# Patient Record
Sex: FEMALE | Race: WHITE
[De-identification: names, ages, dates, MRNs, and addresses within clinical notes are randomized per-mention and may not be internally consistent; named-entity substitution may affect disease eponyms.]

---

## 2019-09-02 ENCOUNTER — HOSPITAL ENCOUNTER (EMERGENCY)
Dept: HOSPITAL 62 - ER | Age: 64
Discharge: HOME | End: 2019-09-02
Payer: COMMERCIAL

## 2019-09-02 VITALS — SYSTOLIC BLOOD PRESSURE: 146 MMHG | DIASTOLIC BLOOD PRESSURE: 75 MMHG

## 2019-09-02 DIAGNOSIS — I25.10: ICD-10-CM

## 2019-09-02 DIAGNOSIS — E11.65: ICD-10-CM

## 2019-09-02 DIAGNOSIS — R10.9: ICD-10-CM

## 2019-09-02 DIAGNOSIS — R10.84: ICD-10-CM

## 2019-09-02 DIAGNOSIS — I11.0: ICD-10-CM

## 2019-09-02 DIAGNOSIS — I50.9: ICD-10-CM

## 2019-09-02 DIAGNOSIS — R11.2: Primary | ICD-10-CM

## 2019-09-02 DIAGNOSIS — Z79.899: ICD-10-CM

## 2019-09-02 DIAGNOSIS — K52.9: ICD-10-CM

## 2019-09-02 LAB
ABSOLUTE LYMPHOCYTES# (MANUAL): 0.7 10^3/UL (ref 0.5–4.7)
ABSOLUTE MONOCYTES # (MANUAL): 0.5 10^3/UL (ref 0.1–1.4)
ADD MANUAL DIFF: YES
ALBUMIN SERPL-MCNC: 4.3 G/DL (ref 3.5–5)
ALP SERPL-CCNC: 170 U/L (ref 38–126)
ANION GAP SERPL CALC-SCNC: 12 MMOL/L (ref 5–19)
ANISOCYTOSIS BLD QL SMEAR: SLIGHT
APPEARANCE UR: CLEAR
APTT PPP: (no result) S
AST SERPL-CCNC: 34 U/L (ref 14–36)
BASE EXCESS BLDV CALC-SCNC: 0.4 MMOL/L
BASOPHILS NFR BLD MANUAL: 0 % (ref 0–2)
BILIRUB DIRECT SERPL-MCNC: 0.3 MG/DL (ref 0–0.4)
BILIRUB SERPL-MCNC: 0.9 MG/DL (ref 0.2–1.3)
BILIRUB UR QL STRIP: NEGATIVE
BUN SERPL-MCNC: 22 MG/DL (ref 7–20)
CALCIUM: 9.3 MG/DL (ref 8.4–10.2)
CHLORIDE SERPL-SCNC: 100 MMOL/L (ref 98–107)
CO2 SERPL-SCNC: 28 MMOL/L (ref 22–30)
EOSINOPHIL NFR BLD MANUAL: 0 % (ref 0–6)
ERYTHROCYTE [DISTWIDTH] IN BLOOD BY AUTOMATED COUNT: 14.8 % (ref 11.5–14)
GLUCOSE SERPL-MCNC: 288 MG/DL (ref 75–110)
GLUCOSE UR STRIP-MCNC: >=500 MG/DL
HCO3 BLDV-SCNC: 26.3 MMOL/L (ref 20–32)
HCT VFR BLD CALC: 35.6 % (ref 36–47)
HGB BLD-MCNC: 12.1 G/DL (ref 12–15.5)
KETONES UR STRIP-MCNC: 20 MG/DL
MCH RBC QN AUTO: 27.7 PG (ref 27–33.4)
MCHC RBC AUTO-ENTMCNC: 34.1 G/DL (ref 32–36)
MCV RBC AUTO: 81 FL (ref 80–97)
MONOCYTES % (MANUAL): 4 % (ref 3–13)
NEUTS BAND NFR BLD MANUAL: 1 % (ref 3–5)
NITRITE UR QL STRIP: NEGATIVE
OVALOCYTES BLD QL SMEAR: SLIGHT
PCO2 BLDV: 47.1 MMHG (ref 35–63)
PH BLDV: 7.36 [PH] (ref 7.3–7.42)
PH UR STRIP: 6 [PH] (ref 5–9)
PLATELET # BLD: 234 10^3/UL (ref 150–450)
PLATELET COMMENT: ADEQUATE
POIKILOCYTOSIS BLD QL SMEAR: SLIGHT
POLYCHROMASIA BLD QL SMEAR: SLIGHT
POTASSIUM SERPL-SCNC: 3.8 MMOL/L (ref 3.6–5)
PROT SERPL-MCNC: 7 G/DL (ref 6.3–8.2)
PROT UR STRIP-MCNC: >=500 MG/DL
RBC # BLD AUTO: 4.39 10^6/UL (ref 3.72–5.28)
SEGMENTED NEUTROPHILS % (MAN): 89 % (ref 42–78)
SP GR UR STRIP: 1.01
TOTAL CELLS COUNTED BLD: 100
UROBILINOGEN UR-MCNC: NEGATIVE MG/DL (ref ?–2)
VARIANT LYMPHS NFR BLD MANUAL: 6 % (ref 13–45)
WBC # BLD AUTO: 11.4 10^3/UL (ref 4–10.5)

## 2019-09-02 PROCEDURE — 83690 ASSAY OF LIPASE: CPT

## 2019-09-02 PROCEDURE — 99284 EMERGENCY DEPT VISIT MOD MDM: CPT

## 2019-09-02 PROCEDURE — 87086 URINE CULTURE/COLONY COUNT: CPT

## 2019-09-02 PROCEDURE — 74177 CT ABD & PELVIS W/CONTRAST: CPT

## 2019-09-02 PROCEDURE — 96361 HYDRATE IV INFUSION ADD-ON: CPT

## 2019-09-02 PROCEDURE — 82803 BLOOD GASES ANY COMBINATION: CPT

## 2019-09-02 PROCEDURE — 80053 COMPREHEN METABOLIC PANEL: CPT

## 2019-09-02 PROCEDURE — 36415 COLL VENOUS BLD VENIPUNCTURE: CPT

## 2019-09-02 PROCEDURE — 85025 COMPLETE CBC W/AUTO DIFF WBC: CPT

## 2019-09-02 PROCEDURE — 96375 TX/PRO/DX INJ NEW DRUG ADDON: CPT

## 2019-09-02 PROCEDURE — 81001 URINALYSIS AUTO W/SCOPE: CPT

## 2019-09-02 PROCEDURE — 96374 THER/PROPH/DIAG INJ IV PUSH: CPT

## 2019-09-02 PROCEDURE — 82962 GLUCOSE BLOOD TEST: CPT

## 2019-09-02 PROCEDURE — 96376 TX/PRO/DX INJ SAME DRUG ADON: CPT

## 2019-09-02 NOTE — ER DOCUMENT REPORT
ED General





- General


Chief Complaint: Abdominal Pain


Stated Complaint: ABDOMINAL PAIN


Time Seen by Provider: 19 09:42


Notes: 





Patient is a 64-year-old female presents to the emergency department with 

approximately 8 episodes of non-bloody vomiting and 4 episodes of nonbloody 

diarrhea since  last evening.  Patient also complaining of generalized lower

abdominal pain.  Patient states it is sharp in nature and constant.


Patient's denying any dysuria or fever.


Nursing note states patient is noncompliant with her medications but when I 

asked the patient what kind of medication she is on she can name them all and 

states she takes them as prescribed.





Past medical history: Diabetes, hypertension, coronary artery disease, 

congestive heart failure


Medications: Metformin, Demerol, metoprolol, simvastatin, amlodipine, 

spironolactone


Allergies: Thiazides


TRAVEL OUTSIDE OF THE [place] IN LAST 30 DAYS: No





- Related Data


Allergies/Adverse Reactions: 


                                        





Thiazides Allergy (Verified 19 10:15)


   











Past Medical History





- General


Information source: Patient





- Social History


Smoking Status: Never Smoker


Chew tobacco use (# tins/day): No


Frequency of alcohol use: None


Drug Abuse: None


Family History: Reviewed & Not Pertinent


Patient has suicidal ideation: No


Patient has homicidal ideation: No





- Past Medical History


Cardiac Medical History: Reports: Hx Heart Attack, Hx Hypercholesterolemia, Hx 

Hypertension


Endocrine Medical History: Reports: Hx Diabetes Mellitus Type 2


Past Surgical History: Reports: Hx Breast Surgery - reduction, Hx Cardiac 

Catheterization, Hx Cardiac Surgery - bypass, Hx  Section - x2, Hx Orth

opedic Surgery - back





Review of Systems





- Review of Systems


Constitutional: denies: Fever


EENT: No symptoms reported


Cardiovascular: No symptoms reported


Respiratory: No symptoms reported


Gastrointestinal: See HPI


Genitourinary: See HPI


Female Genitourinary: No symptoms reported


Musculoskeletal: No symptoms reported


Skin: No symptoms reported


Hematologic/Lymphatic: No symptoms reported


Neurological/Psychological: No symptoms reported





Physical Exam





- Vital signs


Vitals: 


                                        











Temp Pulse Resp BP Pulse Ox


 


 98.0 F   117 H  20   148/80 H  98 


 


 19 09:35  19 09:35  19 09:35  19 09:35  19 09:35














- Notes


Notes: 





GENERAL: Alert, interacts well. No acute distress.


HEAD: Normocephalic, atraumatic.


EYES: Pupils equal, round, and reactive to light. Extraocular movements intact.


ENT: Oral mucosa moist, tongue midline. 


NECK: Full range of motion. Supple. Trachea midline.


LUNGS: Clear to auscultation bilaterally, no wheezes, rales, or rhonchi. No 

respiratory distress.


HEART: Regular rate and rhythm. No murmur


ABDOMEN: Soft, generalized right and left lower abdominal pain noted.  Non-

distended. Bowel sounds present in all 4 quadrants.  No [place] sign noted.


EXTREMITIES: Moves all 4 extremities spontaneously. No edema, normal radial and 

dorsalis pedis pulses bilaterally. No cyanosis.


BACK: no cervical, thoracic, lumbar midline tenderness. No saddle anesthesia, 

normal distal neurovascular exam.  No CVA tenderness noted bilaterally.


NEUROLOGICAL: Alert and oriented x3. Normal speech. cranial nerves II through 

XII grossly intact 


PSYCH: Normal affect, normal mood.


SKIN: Warm, dry, normal turgor. No rashes or lesions noted.








Course





- Re-evaluation


Re-evalutation: 


Patient initially presents to the emergency department via EMS for generalized 

abdominal pain, nausea, vomiting.  Patient's blood pressure via EMS was 

elevated.  Blood pressures in the emergency department have been stable at 

148/80.  Patient was noted to be tachycardic.  According to EMS report patient's

blood sugar was 301.  She was treated with 4 mg of Zofran by EMS.  Patient 

states she has vomited since arrival to the emergency department.  Patient was 

given another 4 mg of Zofran.  Upon reassessment patient states she still feels 

nauseated and vomited once since second Zofran administration.  Phenergan 

ordered and administered.


CT shows signs of enteritis.  Bentyl ordered.  Patient able to p.o. Bentyl with 

no further vomiting.





Upon discharge patient's heart rate was still noted to be tachycardic at 120.  

Patient voices that she is in generalized pain.  Patient was given 500 cc of 

normal saline solution by EMS, another 500 cc of normal saline here in the 

emergency department.  Patient does have a history of congestive heart failure, 

reassessment of patient's lung sounds revealed no rales bilaterally.  Clear lung

sounds all fields noted.  Another 500 cc normal saline solution ordered as well 

as repeat pain management.  Reassessment of generalized abdomen reveals slight 

tenderness in the left lower quadrant but otherwise benign.  Patient is 

complaining of generalized cramping.


Will reassess after both.


19 14:28


Reassessments patient's heart rate is noted to be 92.  Patient was sleeping 

easily arousable to verbal stimuli.  Stable for discharge.





At this time will discharge with return precautions and follow-up 

recommendations.  Verbal discharge instructions given a the bedside and 

opportunity for questions given. Medication warnings reviewed. Patient is in 

agreement with this plan and has verbalized understanding of return precautions 

and the need for primary care follow-up in the next 24-72 hours.





This medical record was dictated with voice recognizing software.  There may be 

grammatical, syntax errors that are unintended.








- Vital Signs


Vital signs: 


                                        











Temp Pulse Resp BP Pulse Ox


 


 97.9 F   92   16   146/75 H  96 


 


 19 14:55  19 14:55  19 14:55  19 14:55  19 14:55














- Laboratory


Result Diagrams: 


                                 19 09:50





                                 19 09:50


Laboratory results interpreted by me: 


                                        











  19





  09:50 09:50 09:50


 


WBC  11.4 H  


 


Hct  35.6 L  


 


RDW  14.8 H  


 


Seg Neuts % (Manual)  89 H  


 


Band Neutrophils %  1 L  


 


Lymphocytes % (Manual)  6 L  


 


Abs Neuts (Manual)  10.3 H  


 


BUN   22 H 


 


Est GFR (MDRD) Non-Af   55 L 


 


Glucose   288 H 


 


POC Glucose   


 


Alkaline Phosphatase   170 H 


 


Urine Protein    >=500 H


 


Urine Glucose (UA)    >=500 H


 


Urine Ketones    20 H


 


Urine Blood    SMALL H


 


Ur Leukocyte Esterase    TRACE H














  19





  13:38


 


WBC 


 


Hct 


 


RDW 


 


Seg Neuts % (Manual) 


 


Band Neutrophils % 


 


Lymphocytes % (Manual) 


 


Abs Neuts (Manual) 


 


BUN 


 


Est GFR (MDRD) Non-Af 


 


Glucose 


 


POC Glucose  245 H


 


Alkaline Phosphatase 


 


Urine Protein 


 


Urine Glucose (UA) 


 


Urine Ketones 


 


Urine Blood 


 


Ur Leukocyte Esterase 














Discharge





- Discharge


Clinical Impression: 


 Nausea vomiting and diarrhea, Enteritis, Hyperglycemia





Condition: Stable


Disposition: HOME, SELF-CARE


Instructions:  Abdominal Pain ([facility]), Antinausea Medication ([facility]), Antispasmodics

([facility]), Diarrhea, Nonspecific ([facility]), Intravenous (IV) Fluids ([facility]), Vomiting 

([facility])


Additional Instructions: 


As we discussed you have been seen and treated in the emergency department for 

nausea, vomiting, diarrhea, abdominal pain.  Your imaging and labs reveal viral 

infection.  Please make sure you are taking antinausea medication and pain me

dication as prescribed.  Please also make sure you are taking your home 

prescriptions as prescribed.  Please follow-up with your primary care provider 

in the next 24 to 48 hours.  Please return to the emergency room for any further

concerns.


Prescriptions: 


Dicyclomine HCl [Bentyl 20 mg Tablet] 20 mg PO QID #40 tablet


Promethazine HCl [Phenergan 25 mg Tablet] 1 tab PO Q6H PRN #10 tablet


 PRN Reason:

## 2019-09-02 NOTE — RADIOLOGY REPORT (SQ)
EXAM DESCRIPTION:  CT ABD/PELVIS WITH IV ONLY



COMPLETED DATE/TIME:  9/2/2019 11:29 am



REASON FOR STUDY:  general lower abd pain



COMPARISON:  None.



TECHNIQUE:  CT scan of the abdomen and pelvis performed using helical scanning technique with dynamic
 intravenous contrast injection.  No oral contrast. Images reviewed with lung, soft tissue, and bone 
windows. Reconstructed coronal and sagittal MPR images reviewed. Delayed images for evaluation of the
 urinary system also acquired. All images stored on PACS.

All CT scanners at this facility use dose modulation, iterative reconstruction, and/or weight based d
osing when appropriate to reduce radiation dose to as low as reasonably achievable (ALARA).

[facility]: Dose Right  [facility]: CareDose    [facility]: Dose Right    [facility]: Teradose 4D    [facility]: [employer] Technologies



CONTRAST TYPE AND DOSE:  contrast/concentration: Isovue 350.00 mg/ml; Total Contrast Delivered: 100.0
 ml; Total Saline Delivered: 72.0 ml



RENAL FUNCTION:  BUN 12 creatinine 1.0.



RADIATION DOSE:  CT Rad equipment meets quality standard of care and radiation dose reduction techniq
ues were employed. CTDIvol: 14.7 - 18.6 mGy. DLP: 1760 mGy-cm..



LIMITATIONS:  None.



FINDINGS:  LOWER CHEST: No significant findings. No nodules or infiltrates.

LIVER: Normal size. No masses.  No dilated ducts.

SPLEEN: Normal size. No focal lesions.

PANCREAS: No masses. No significant calcifications. No adjacent inflammation or peripancreatic fluid 
collections. Pancreatic duct not dilated.

GALLBLADDER: Surgically absent.

ADRENAL GLANDS: No significant masses or asymmetry.

RIGHT KIDNEY AND URETER: No solid masses.   No significant calcifications.   No hydronephrosis or hyd
roureter.

LEFT KIDNEY AND URETER: No solid masses.   No significant calcifications.   No hydronephrosis or hydr
oureter.

AORTA AND VESSELS: No aneurysm. No dissection. Renal arteries, SMA, celiac without stenosis.

RETROPERITONEUM: No retroperitoneal adenopathy, hemorrhage or masses.

BOWEL AND PERITONEAL CAVITY: There is a focal segment of bowel wall thickening and mild inflammation 
involving a segment of mid small bowel on the left side of the abdomen. No free fluid or peritoneal m
asses.

APPENDIX: Not visualized.

PELVIS: No mass.  No free fluid. Normal bladder.

ABDOMINAL WALL: No masses. No hernias.

BONES: No significant or acute findings.

OTHER: No other significant finding.



IMPRESSION:

1. FOCAL AREA OF INFLAMMATION INVOLVING A SEGMENT OF MID SMALL BOWEL IN THE LEFT SIDE OF THE ABDOMEN,
 PRESUMABLY DUE TO ENTERITIS.

2. NO OTHER SIGNIFICANT OR ACUTE FINDING IN THE ABDOMEN OR PELVIS ON CT SCAN WITH IV CONTRAST.



TECHNICAL DOCUMENTATION:  JOB ID:  [id number]

Quality ID # [id number]: Final reports with documentation of one or more dose reduction techniques (e.g., Au
tomated exposure control, adjustment of the mA and/or kV according to patient size, use of iterative 
reconstruction technique)

 2011 [employer]- All Rights Reserved



Reading location - IP/workstation name: [email or web address]